# Patient Record
Sex: FEMALE | Race: WHITE
[De-identification: names, ages, dates, MRNs, and addresses within clinical notes are randomized per-mention and may not be internally consistent; named-entity substitution may affect disease eponyms.]

---

## 2021-06-16 ENCOUNTER — HOSPITAL ENCOUNTER (EMERGENCY)
Dept: HOSPITAL 65 - ER | Age: 46
Discharge: HOME | End: 2021-06-16
Payer: COMMERCIAL

## 2021-06-16 VITALS — DIASTOLIC BLOOD PRESSURE: 82 MMHG | SYSTOLIC BLOOD PRESSURE: 137 MMHG

## 2021-06-16 VITALS — BODY MASS INDEX: 34.04 KG/M2 | WEIGHT: 185 LBS | HEIGHT: 62 IN

## 2021-06-16 DIAGNOSIS — Z79.52: ICD-10-CM

## 2021-06-16 DIAGNOSIS — Z79.899: ICD-10-CM

## 2021-06-16 DIAGNOSIS — M54.5: Primary | ICD-10-CM

## 2021-06-16 DIAGNOSIS — Z79.1: ICD-10-CM

## 2021-06-16 DIAGNOSIS — Z90.49: ICD-10-CM

## 2021-06-16 DIAGNOSIS — Z88.8: ICD-10-CM

## 2021-06-16 DIAGNOSIS — Z88.0: ICD-10-CM

## 2021-06-16 PROCEDURE — 20552 NJX 1/MLT TRIGGER POINT 1/2: CPT

## 2021-06-16 PROCEDURE — 96372 THER/PROPH/DIAG INJ SC/IM: CPT

## 2021-06-16 PROCEDURE — 99284 EMERGENCY DEPT VISIT MOD MDM: CPT

## 2021-06-16 NOTE — NUR
Patient presents to ED with C/O lower back, coccyx pain that started about 
three days ago. Patient states, "It just started hurting and has gotten worse 
over the last three days. When standing for long periods of time, the pain 
radiates up my back and across my left hip to front. I tried tylenol, 
ibuprofen, and naproxen and the naproxen helped a little, but not much." 
Patient rates pain 8/10 and worse with movement. No previous back injuries or 
pain in back. No other health history.

## 2021-06-16 NOTE — ER.PDOC
General


Chief Complaint:  Lower Back Pain or Injury


Stated Complaint:  BACK PAIN


Time seen by MD:  23:00


Source:  patient, family


Exam Limitations:  no limitations





History of Present Illness


Initial Comments


46-year-old white female with no prior medical history of anything comes in with

complaint of having a low back pain process that started Saturday with some 

aching in her left lower back.  This is not associated with any trauma of any 

type.  She then felt good on Sunday but then Monday through today this pain is 

slowly became worse and worse.  The pain is all in the left SI joint area and 

radiates around to the front.  She has no pain going down her leg.  She has no 

pain going up in the back.  The pain is not associated with any specific things 

she does.  She states that laying down seems to be actually worse than getting 

up and walking around.  She states that sitting is better than lying down.  She 

laid down tonight and that is when her pain became the worst and is the reason 

she decided to come in to get checked out.


Timing/Duration:  getting worse, changing over time


Severity/Quality:  severe, sharpness


Radiation:  other (Patient pain is over the left SI joint and radiates around to

the left side into her left groin area it does not go down the leg.)


Method of Injury:  other (Patient denies any injury)


Modifying Factors:  improves with movement (Pain is made better by walking 

around worse when trying to lay down)


Associated Symptoms:  denies symptoms


Allergies:  


Coded Allergies:  


     latex (Unverified  Allergy, Intermediate, 7/9/15)


     penicillin (Unverified  Allergy, Intermediate, 7/9/15)


     meloxicam (Verified  Allergy, Mild, 6/16/21)


   Hair falls out


Uncoded Allergies:  


     mophine (Allergy, Mild, Rash, 6/16/21)





Past Medical History


Medical History:  no pertinent history


Surgical History:  cholecystectomy





Social History


Smoking:  non-smoker


Alcohol Use:  none


Drug Use:  none





Review of Systems


Musculoskeletal:  see HPI


All Other Systems:  Reviewed and Negative





Physical Exam


General Appearance:  No Apparent Distress, WD/WN


HEENT:  PERRL/EOMI, Normal ENT Inspection, TMs Normal, Pharynx Normal


Neck:  Non-Tender, Normal Alignment


Cardiovascular/Respiratory:  Regular Rate, Rhythm, No M/R/G, Normal Peripheral 

Pulses, No JVD, Normal Breath Sounds, No Respiratory Distress


Gastrointestinal:  Normal Bowel Sounds, No Organomegaly, No Pulsatile Mass, Non 

Tender, Soft


Back:  No CVA Tenderness, No Vertebral Tenderness, Decreased Range Of Motion, 

Other (Patient does have pain over the left SI joint area.  She does not have 

pain on the right side at all.  The pain is not midline but right over the SI 

joint.)


Extremities:  No Evidence of Injury, Normal Range of Motion, Non-Tender, No 

Pedal Edema, Pelvis Stable


Neuro/Psych:  Alert,  nml/symmetrical, mood/effect nml, No Motor/Sensory 

Deficits, Relexes nml


Skin:  Normal Color, Warm/Dry





Results/Orders


Results/Orders





Orders - DEBORA RUBIO MD


Ondansetron Hcl/Pf (Zofran) (6/16/21 23:17)


Ketorolac Tromethamine (Toradol) (6/16/21 23:17)


Methylprednisolone Acetate (Depo-Medrol) (6/16/21 23:18)


Ketorolac Tromethamine (Toradol) (6/16/21 23:30)


Ondansetron (Zofran Odt) (6/16/21 23:16)


Bupivacaine Hcl/Pf (Sensorcaine 0.5% Via (6/16/21 23:30)


Methylprednisolone Acetate (Depo-Medrol) (6/16/21 23:16)


Cyclobenzaprine Hcl (Flexeril) (6/16/21 23:41)





Vital Signs








  Date Time  Temp Pulse Resp B/P (MAP) Pulse Ox O2 Delivery O2 Flow Rate FiO2


 


6/16/21 22:54 99.0 97 20 137/82 (100) 97   


 


6/16/21 22:54 99.0 97 20  97   


 


6/16/21 22:54 99.0 97 20     








Administered Medications








 Medications


  (Trade)  Dose


 Ordered  Sig/Russell


 Route


 PRN Reason  Start Time


 Stop Time Status Last Admin


Dose Admin


 


 Bupivacaine HCl


  (Sensorcaine


 0.5% Vial)  5 mg  OT  ONCE


 IJ


   6/16/21 23:30


 6/16/21 23:31 UNV 6/16/21 23:33


5 MG


 


 Ketorolac


 Tromethamine


  (Toradol)  60 mg  OT  ONCE


 IM


   6/16/21 23:30


 6/16/21 23:31 UNV 6/16/21 23:30


60 MG


 


 Methylprednisolone


 Acetate


  (Depo-Medrol)  40 mg  STAT  STAT


 IM


   6/16/21 23:16


 6/16/21 23:17 UNV 6/16/21 23:33


40 MG


 


 Ondansetron HCl


  (Zofran Odt)  4 mg  OT  STAT


 SL


   6/16/21 23:16


 6/16/21 23:17 UNV 6/16/21 23:25


4 MG











Progress


Progress


I offered a steroid and bupivacaine shot into her painful L4-L5 facet area and 

in the L5-S1 joint area.  Patient agreed to the injection.  The use total of 10 

cc with 9 cc BN 0.5 bupivacaine and 1 cc of Depo-Medrol for a total of 40 mg of 

Depo.  Patient was laying flat on her abdomen the most tender point was isolated

and an injection was made into that.  Did locate the facet with finding the 

spinous process and then lateral and inferior to that fill the bony aspect of 

the facet.  A block was then placed directly into that and her pain dramatically

improved.  This was the L4-5 facet.  And moved down and injected some at the 

L5S1 junction and head some more improvement but not as much as the L4-5.Patient

is been up walking around twisting and turning and finds that her back pain is 

dramatically better.





ER DEPART


Departure


Time of Disposition:  23:49


Disposition:  01 HOME / SELF CARE / HOMELESS


Impression:  


   Primary Impression:  


   Low back pain


Condition:  Improved


Referrals:  


DUSTIN SANCHES-C (PCP)


PRIMARY CARE PROVIDER


Comments


motrin 800mg, one po tid prn pain,  Tramadol 50mg,  one po tid prn pain,  #20


Duration or Time Spent with Pa:  20m











DEBORA RUBIO MD            Jun 16, 2021 23:15